# Patient Record
(demographics unavailable — no encounter records)

---

## 2024-10-09 NOTE — ASSESSMENT
[FreeTextEntry1] : 61-year-old female with chronic insomnia. She has been using Ambien for years and attempting to wean off. Belsomra has been ineffective. Ideally would need CBTi. She will attempt to find one through insurance. Otherwise can trial a web-based CBTi.  Follow-up in 2 - 3 months, sooner if needed.

## 2024-10-09 NOTE — REASON FOR VISIT
Discharge paperwork and instructions given to Booker Ivory. Discussed prescription and follow-up with vascular surgeon if needed. No further questions at this time. To discharge home independently with family.      [Home] : at home, [unfilled] , at the time of the visit. [Medical Office: ([facility])___] : at the medical office located in  [Patient] : the patient [Follow-Up] : a follow-up visit [FreeTextEntry2] : chronic insomnia

## 2024-10-09 NOTE — PHYSICAL EXAM
[General Appearance - Well Developed] : well developed [Normal Appearance] : normal appearance [General Appearance - Well Nourished] : well nourished [No Deformities] : no deformities [] : no respiratory distress [Exaggerated Use Of Accessory Muscles For Inspiration] : no accessory muscle use [Involuntary Movements] : no involuntary movements were seen [No Focal Deficits] : no focal deficits [Oriented To Time, Place, And Person] : oriented to person, place, and time [Impaired Insight] : insight and judgment were intact [Affect] : the affect was normal [Neck Appearance] : the appearance of the neck was normal

## 2024-10-09 NOTE — HISTORY OF PRESENT ILLNESS
[FreeTextEntry1] : 61 year old female with chronic insomnia here for a follow up visit.  Coexisting medical conditions include prediabetes, hypertriglyceridemia, and bruxism.  Tried Belsomra 20 mg but did not help her sleep and she felt groggy the following day due to poor sleep. She has resumed Ambien. She has 10 mg pills which she breaks up into about 1/3 of the pill. She has tried Ambien 5 mg but feels that does not work though it should be a higher dose than the 1/3 of the 10 mg pill. Her ultimate goal is to eliminate the Ambien. She reached out to Dr. Annmarie Atkins but her insurance is not accepted. She was directed to [facility].  History: She has had insomnia for over 15 years.  She states that her sleep issues started after having children.  She is a light sleeper and wakes up multiple times.  She has been on Ambien for over 10 years.  She starts winding down and starts her bedtime routine around 10 PM.  She goes into bed around 11-11 30 at which point she takes Ambien.  She is unable to sleep without Ambien.  She is usually up around 6 AM but has a fitful sleep until 7 AM.  She gets a total of about 6 hours of sleep but needs 7 or more hours of sleep to feel rested which she does not get on the current regimen.  She was unable to follow-up with Dr. West for CBT-i as her insurance was not accepted.  She states that she tends to have more sleep when she has things to do in the morning and she perseverates about it.  Home sleep study previously performed showed no significant sleep apnea.  She has not had significant changes in weight since then.  HST (3/8/2021) WENCESLAO 1.6/hr, T90 0%

## 2025-01-27 NOTE — ASSESSMENT
[FreeTextEntry1] : 62-year-old female with chronic insomnia. She has been using Ambien for years and attempting to wean off. Belsomra has been ineffective. Will refer to [facility] for CBTi  Follow-up in 3 months, sooner if needed.

## 2025-01-27 NOTE — REASON FOR VISIT
[Follow-Up] : a follow-up visit [Home] : at home, [unfilled] , at the time of the visit. [Medical Office: ([facility])___] : at the medical office located in  [Patient] : the patient [FreeTextEntry2] : chronic insomnia

## 2025-01-27 NOTE — HISTORY OF PRESENT ILLNESS
[FreeTextEntry1] : 62-year-old female with chronic insomnia here for a follow up visit.  Coexisting medical conditions include prediabetes, hypertriglyceridemia, and bruxism.  Stable on Ambien. Has the 10 mg pills which she cuts into about 1/3. She has been busy through the holidays and had not been able to wean further. She was previously unable to find CBTi accepted through her insurance.   History: She has had insomnia for over 15 years.  She states that her sleep issues started after having children.  She is a light sleeper and wakes up multiple times.  She has been on Ambien for over 10 years.  She starts winding down and starts her bedtime routine around 10 PM.  She goes into bed around 11-11 30 at which point she takes Ambien.  She is unable to sleep without Ambien.  She is usually up around 6 AM but has a fitful sleep until 7 AM.  She gets a total of about 6 hours of sleep but needs 7 or more hours of sleep to feel rested which she does not get on the current regimen.  She was unable to follow-up with Dr. West for CBT-i as her insurance was not accepted.  She states that she tends to have more sleep when she has things to do in the morning and she perseverates about it.  Home sleep study previously performed showed no significant sleep apnea.  She has not had significant changes in weight since then.  HST (3/8/2021) WENCESLAO 1.6/hr, T90 0%

## 2025-04-30 NOTE — PLAN
[FreeTextEntry1] : 61 y/o F presents for annual visit.   #HM -Pap with HPV up to date -Mammo up to date -Colonoscopy up to date  -DEXA requisition provided  -PHQ 9 administered, reviewed with pt, depression screen negative  RTO 1 year or PRN jose francisco MAHAJAN

## 2025-04-30 NOTE — PLAN
[FreeTextEntry1] : 63 y/o F presents for annual visit.   #HM -Pap with HPV up to date -Mammo up to date -Colonoscopy up to date  -DEXA requisition provided  -PHQ 9 administered, reviewed with pt, depression screen negative  RTO 1 year or PRN jose francisco MAHAJAN

## 2025-04-30 NOTE — HISTORY OF PRESENT ILLNESS
[FreeTextEntry1] : 63 y/o F presents for annual visit. No GYN complaints.  Menopause at 55, denies PMB  Not sexually active   Interim medical hx: none   HM Pap NILM HPV neg 2023 Mammo 2025 BIRADS 1 Colonoscopy 2024, repeat in 5 year s DEXA 2023 osteoporosis, s/p reclast treatment

## 2025-04-30 NOTE — PHYSICAL EXAM
[MA] : MA [FreeTextEntry2] : Joyce [Appropriately responsive] : appropriately responsive [Alert] : alert [No Acute Distress] : no acute distress [Soft] : soft [Non-tender] : non-tender [Non-distended] : non-distended [No HSM] : No HSM [No Lesions] : no lesions [No Mass] : no mass [Oriented x3] : oriented x3 [Examination Of The Breasts] : a normal appearance [No Masses] : no breast masses were palpable [Labia Majora] : normal [Labia Minora] : normal [Atrophy] : atrophy [Normal] : normal [Tenderness] : nontender [Enlarged ___ wks] : not enlarged [Uterine Adnexae] : normal

## 2025-05-05 NOTE — ASSESSMENT
[FreeTextEntry1] : 62-year-old female with chronic insomnia. She has been using Ambien for years and attempting to wean off. Can continue Ambien for now. Discuss stimulus control techniques. Hopefully can get into a CBTi program.  Follow-up in 6 months, sooner if needed.

## 2025-05-05 NOTE — HISTORY OF PRESENT ILLNESS
[FreeTextEntry1] : 62-year-old female with chronic insomnia here for a follow up visit.  Coexisting medical conditions include prediabetes, hypertriglyceridemia, and bruxism.  Stable on Ambien. Has the 10 mg pills which she cuts into about 1/3. Was referred to [facility] for [facility] and she initially received a callback stating she will be scheduled, but she has not received a follow up call. No change in her sleep schedule or pattern.  History: She has had insomnia for over 15 years.  She states that her sleep issues started after having children.  She is a light sleeper and wakes up multiple times.  She has been on Ambien for over 10 years.  She starts winding down and starts her bedtime routine around 10 PM.  She goes into bed around 11-11 30 at which point she takes Ambien.  She is unable to sleep without Ambien.  She is usually up around 6 AM but has a fitful sleep until 7 AM.  She gets a total of about 6 hours of sleep but needs 7 or more hours of sleep to feel rested which she does not get on the current regimen.  She was unable to follow-up with Dr. West for CBT-i as her insurance was not accepted.  She states that she tends to have more sleep when she has things to do in the morning and she perseverates about it.  Home sleep study previously performed showed no significant sleep apnea.  She has not had significant changes in weight since then.  HST (3/8/2021) WENCESLAO 1.6/hr, T90 0%

## 2025-05-05 NOTE — REASON FOR VISIT
[Home] : at home, [unfilled] , at the time of the visit. [Medical Office: ([facility])___] : at the medical office located in  [Patient] : the patient [Follow-Up] : a follow-up visit [FreeTextEntry2] : chronic insomnia

## 2025-06-10 NOTE — PHYSICAL EXAM
[No Acute Distress] : no acute distress [Well Nourished] : well nourished [Well-Appearing] : well-appearing [Well Developed] : well developed [Normal Sclera/Conjunctiva] : normal sclera/conjunctiva [EOMI] : extraocular movements intact [Normal Outer Ear/Nose] : the outer ears and nose were normal in appearance [Normal Oropharynx] : the oropharynx was normal [No JVD] : no jugular venous distention [No Lymphadenopathy] : no lymphadenopathy [Supple] : supple [Thyroid Normal, No Nodules] : the thyroid was normal and there were no nodules present [No Respiratory Distress] : no respiratory distress  [No Accessory Muscle Use] : no accessory muscle use [Clear to Auscultation] : lungs were clear to auscultation bilaterally [Normal Rate] : normal rate  [Regular Rhythm] : with a regular rhythm [Normal S1, S2] : normal S1 and S2 [No Murmur] : no murmur heard [No Abdominal Bruit] : a ~M bruit was not heard ~T in the abdomen [No Varicosities] : no varicosities [Pedal Pulses Present] : the pedal pulses are present [No Edema] : there was no peripheral edema [No Palpable Aorta] : no palpable aorta [No Extremity Clubbing/Cyanosis] : no extremity clubbing/cyanosis [Soft] : abdomen soft [Non Tender] : non-tender [Non-distended] : non-distended [No Masses] : no abdominal mass palpated [No HSM] : no HSM [Normal Bowel Sounds] : normal bowel sounds [Normal Posterior Cervical Nodes] : no posterior cervical lymphadenopathy [Normal Anterior Cervical Nodes] : no anterior cervical lymphadenopathy [No CVA Tenderness] : no CVA  tenderness [No Spinal Tenderness] : no spinal tenderness [No Joint Swelling] : no joint swelling [Grossly Normal Strength/Tone] : grossly normal strength/tone [No Rash] : no rash [Coordination Grossly Intact] : coordination grossly intact [No Focal Deficits] : no focal deficits [Normal Gait] : normal gait [Normal Affect] : the affect was normal [Normal Insight/Judgement] : insight and judgment were intact

## 2025-06-10 NOTE — HISTORY OF PRESENT ILLNESS
[FreeTextEntry1] : CPE [de-identified] : 62 y.o. woman w/ hx hypertriglyceridemia, osteoporosis, pre-DM, and insomnia here for annual exam. - osteoporosis: seeing [facility], doing reclast infusion x 3 years last bone density 08/2023  - insomnia: on ambien 5-10 mg daily x 10 years, under care of sleep medicine  denies any other associated symptoms denies any other complaints or concerns at this time

## 2025-06-10 NOTE — HEALTH RISK ASSESSMENT
[Very Good] : ~his/her~  mood as very good [No] : In the past 12 months have you used drugs other than those required for medical reasons? No [0] : 2) Feeling down, depressed, or hopeless: Not at all (0) [PHQ-2 Negative - No further assessment needed] : PHQ-2 Negative - No further assessment needed [Never] : Never [Patient reported mammogram was normal] : Patient reported mammogram was normal [Patient reported PAP Smear was normal] : Patient reported PAP Smear was normal [Patient reported bone density results were abnormal] : Patient reported bone density results were abnormal [Patient reported colonoscopy was normal] : Patient reported colonoscopy was normal [Alone] : lives alone [Employed] : employed [# Of Children ___] : has [unfilled] children [] :  [Feels Safe at Home] : Feels safe at home [Fully functional (bathing, dressing, toileting, transferring, walking, feeding)] : Fully functional (bathing, dressing, toileting, transferring, walking, feeding) [Fully functional (using the telephone, shopping, preparing meals, housekeeping, doing laundry, using] : Fully functional and needs no help or supervision to perform IADLs (using the telephone, shopping, preparing meals, housekeeping, doing laundry, using transportation, managing medications and managing finances) [FreeTextEntry1] : none [de-identified] : none  [de-identified] : gyn, endo, pulm, derm  [de-identified] : yoga 2-3 x week, walking 4 miles a day if she can [de-identified] : balanced; supplements- vit d, ca, mg, omega 3, probiotics, turmeric, zinc, NAC, vit c [[id number]] : 0 [de-identified] : + anxiety  [Change in mental status noted] : No change in mental status noted [Sexually Active] : not sexually active [Reports changes in hearing] : Reports no changes in hearing [High Risk Behavior] : no high risk behavior [Reports changes in vision] : Reports no changes in vision [MammogramDate] : 04/2025 [Reports changes in dental health] : Reports no changes in dental health [PapSmearDate] : 03/2023 [BoneDensityDate] : 08/2023 [BoneDensityComments] : osteoporosis  [ColonoscopyDate] : 06/2024  [ColonoscopyComments] : repeat in 5-10 years  [FreeTextEntry2] :   [de-identified] : + dry eyes, seeing eye doctor

## 2025-06-10 NOTE — ASSESSMENT
[Vaccines Reviewed] : Immunizations reviewed today. Please see immunization details in the vaccine log within the immunization flowsheet.  [FreeTextEntry1] : Routine medical examination VSS- exam normal  c/w current medications Advised healthy diet and exercise will follow up labs  patient verbalizes understanding and patient is stable upon discharge

## 2025-07-22 NOTE — PHYSICAL EXAM
[Normal] : normal rate, regular rhythm, normal S1 and S2 and no murmur heard [de-identified] :  post nasal drip

## 2025-07-22 NOTE — PHYSICAL EXAM
[Normal] : normal rate, regular rhythm, normal S1 and S2 and no murmur heard [de-identified] :  post nasal drip

## 2025-07-22 NOTE — ASSESSMENT
[FreeTextEntry1] : VSS Rapid strep and rapid flu negative symptoms likely due to covid covid/flu/rsv pcr pending  reviewed treatment options with patient including paxlovid, and conservative treatment  advised to increase fluid intake, rest, and acetaminophen/ibuprofen prn pain/fever advised to c/w isolation for 5 days from if positive test if sx resolving, c/w mask post isolation period  advised if sx worsens- including but not limited to sob- to go to ED  advised to follow up if sx persists  patient verbalizes understanding and is stable upon d/c

## 2025-07-22 NOTE — HISTORY OF PRESENT ILLNESS
[FreeTextEntry8] : 61 yo f, pmhx of HLD and prediabetes, c/o cough and congestion sx have been ongoing since 1 day symptoms include: cough, congestion, chills, dry throat  denies any sob/chest tightness Sick contacts- none recent travel- none self treatment- none tested prior- did not test vaccine status- utd on covid vaccines, utd on flu shot denies any other associated symptoms denies any other complaints or concerns at this time

## 2025-07-22 NOTE — HISTORY OF PRESENT ILLNESS
[FreeTextEntry8] : 63 yo f, pmhx of HLD and prediabetes, c/o cough and congestion sx have been ongoing since 1 day symptoms include: cough, congestion, chills, dry throat  denies any sob/chest tightness Sick contacts- none recent travel- none self treatment- none tested prior- did not test vaccine status- utd on covid vaccines, utd on flu shot denies any other associated symptoms denies any other complaints or concerns at this time